# Patient Record
Sex: FEMALE | Race: OTHER | HISPANIC OR LATINO
[De-identification: names, ages, dates, MRNs, and addresses within clinical notes are randomized per-mention and may not be internally consistent; named-entity substitution may affect disease eponyms.]

---

## 2017-12-18 ENCOUNTER — APPOINTMENT (OUTPATIENT)
Dept: ANTEPARTUM | Facility: CLINIC | Age: 32
End: 2017-12-18
Payer: COMMERCIAL

## 2017-12-18 ENCOUNTER — ASOB RESULT (OUTPATIENT)
Age: 32
End: 2017-12-18

## 2017-12-18 DIAGNOSIS — O35.1XX1 MATERNAL CARE FOR (SUSPECTED) CHROMOSOMAL ABNORMALITY IN FETUS, FETUS 1: ICD-10-CM

## 2017-12-18 PROCEDURE — 36416 COLLJ CAPILLARY BLOOD SPEC: CPT

## 2017-12-18 PROCEDURE — 76801 OB US < 14 WKS SINGLE FETUS: CPT

## 2017-12-18 PROCEDURE — 76813 OB US NUCHAL MEAS 1 GEST: CPT

## 2017-12-22 LAB
1ST TRIMESTER DATA: NORMAL
ADDENDUM DOC: NORMAL
AFP PNL SERPL: NORMAL
AFP SERPL-ACNC: NORMAL
CLINICAL BIOCHEMIST REVIEW: NORMAL
FREE BETA HCG 1ST TRIMESTER: NORMAL
Lab: NORMAL
NOTES NTD: NORMAL
NT: NORMAL
PAPP-A SERPL-ACNC: NORMAL
TRISOMY 18/3: NORMAL

## 2018-01-19 ENCOUNTER — EMERGENCY (EMERGENCY)
Facility: HOSPITAL | Age: 33
LOS: 1 days | Discharge: HOME | End: 2018-01-19
Attending: EMERGENCY MEDICINE | Admitting: EMERGENCY MEDICINE
Payer: COMMERCIAL

## 2018-01-19 VITALS
RESPIRATION RATE: 18 BRPM | HEART RATE: 90 BPM | OXYGEN SATURATION: 99 % | SYSTOLIC BLOOD PRESSURE: 118 MMHG | DIASTOLIC BLOOD PRESSURE: 53 MMHG | TEMPERATURE: 98 F

## 2018-01-19 PROCEDURE — 99283 EMERGENCY DEPT VISIT LOW MDM: CPT

## 2018-01-19 NOTE — ED ADULT TRIAGE NOTE - CHIEF COMPLAINT QUOTE
pt 17 weeks  pregnant kitty 6/27 c/o pain to coccyx area that began this morning. no trauma or injury, pmh herpes states "this was the pain that happened when I was diagnosed with herpes," pt denies abd pain, vaginal bleeding, n/v spoke with l&d, pt to be seen in ed. appears comfortable in triage, no other pmh

## 2018-01-20 RX ORDER — IBUPROFEN 200 MG
400 TABLET ORAL ONCE
Qty: 0 | Refills: 0 | Status: COMPLETED | OUTPATIENT
Start: 2018-01-20 | End: 2018-01-20

## 2018-01-20 RX ORDER — ACETAMINOPHEN 500 MG
975 TABLET ORAL ONCE
Qty: 0 | Refills: 0 | Status: COMPLETED | OUTPATIENT
Start: 2018-01-20 | End: 2018-01-20

## 2018-01-20 RX ORDER — IBUPROFEN 200 MG
1 TABLET ORAL
Qty: 50 | Refills: 0
Start: 2018-01-20

## 2018-01-20 RX ORDER — ACYCLOVIR SODIUM 500 MG
1 VIAL (EA) INTRAVENOUS
Qty: 15 | Refills: 0
Start: 2018-01-20 | End: 2018-01-24

## 2018-01-20 RX ORDER — ACETAMINOPHEN 500 MG
1 TABLET ORAL
Qty: 50 | Refills: 0
Start: 2018-01-20

## 2018-01-20 RX ADMIN — Medication 975 MILLIGRAM(S): at 01:57

## 2018-01-20 RX ADMIN — Medication 400 MILLIGRAM(S): at 01:58

## 2018-01-20 NOTE — ED PROVIDER NOTE - MUSCULOSKELETAL, MLM
Spine appears normal, range of motion is not limited, no muscle or joint tenderness. Coccygeal area TTP.

## 2018-01-20 NOTE — ED PROVIDER NOTE - OBJECTIVE STATEMENT
Heriberto: 32 F, 17-weeks pregnant, c/o 2 days coccygeal pain. No trauma/F. H/o genital herpes, never had an outbreak. No rash now. Pain w/ pressure on coccyx.

## 2018-01-20 NOTE — ED PROVIDER NOTE - PLAN OF CARE
Take medications as prescribed. Use acetaminophen (Tylenol) first to control pain. If not effective, take the ibuprofen in addition to the acetaminophen (Tylenol). Do not take more than 1 week of ibuprofen.   If rash appears in affected area, fill the prescription for Acyclovir.    Follow up your OB doctor about this issue (these issues): coccydynia, likely related to ligamentous stretch.     Return if pain not controlled with oral medications.     Use sitting donut to relieve pressure on the painful area. Take medications as prescribed. Use acetaminophen (Tylenol) first to control pain. If not effective, take the ibuprofen in addition to the acetaminophen (Tylenol). Do not take more than 1 week of ibuprofen.   If rash appears in affected area, fill the prescription for Acyclovir.    Follow up your OB doctor about this issue (these issues): coccydynia, likely related to ligamentous stretch.     Return if pain not controlled with oral medications.     Use sitting donut to relieve pressure on the painful area.    Use warm compresses (such as warm rice in a plastic bag) for 15 minutes 3 times a day.

## 2018-01-20 NOTE — ED PROVIDER NOTE - CARE PLAN
Principal Discharge DX:	Coccydynia  Assessment and plan of treatment:	Take medications as prescribed. Use acetaminophen (Tylenol) first to control pain. If not effective, take the ibuprofen in addition to the acetaminophen (Tylenol). Do not take more than 1 week of ibuprofen.   If rash appears in affected area, fill the prescription for Acyclovir.    Follow up your OB doctor about this issue (these issues): coccydynia, likely related to ligamentous stretch.     Return if pain not controlled with oral medications.     Use sitting donut to relieve pressure on the painful area.  Secondary Diagnosis:	Pregnancy Principal Discharge DX:	Coccydynia  Assessment and plan of treatment:	Take medications as prescribed. Use acetaminophen (Tylenol) first to control pain. If not effective, take the ibuprofen in addition to the acetaminophen (Tylenol). Do not take more than 1 week of ibuprofen.   If rash appears in affected area, fill the prescription for Acyclovir.    Follow up your OB doctor about this issue (these issues): coccydynia, likely related to ligamentous stretch.     Return if pain not controlled with oral medications.     Use sitting donut to relieve pressure on the painful area.    Use warm compresses (such as warm rice in a plastic bag) for 15 minutes 3 times a day.  Secondary Diagnosis:	Pregnancy

## 2018-01-20 NOTE — ED PROVIDER NOTE - MEDICAL DECISION MAKING DETAILS
Heriberto: Coccydynia likely 2/2 ligament stretch during pregnancy. Less likely herpes simplex prodrome.

## 2018-02-19 ENCOUNTER — APPOINTMENT (OUTPATIENT)
Dept: ANTEPARTUM | Facility: CLINIC | Age: 33
End: 2018-02-19
Payer: COMMERCIAL

## 2018-02-19 ENCOUNTER — ASOB RESULT (OUTPATIENT)
Age: 33
End: 2018-02-19

## 2018-02-19 PROCEDURE — 76811 OB US DETAILED SNGL FETUS: CPT | Mod: 59

## 2018-02-19 PROCEDURE — 76817 TRANSVAGINAL US OBSTETRIC: CPT

## 2018-04-23 ENCOUNTER — APPOINTMENT (OUTPATIENT)
Dept: ANTEPARTUM | Facility: CLINIC | Age: 33
End: 2018-04-23
Payer: COMMERCIAL

## 2018-04-23 ENCOUNTER — ASOB RESULT (OUTPATIENT)
Age: 33
End: 2018-04-23

## 2018-04-23 PROCEDURE — 76816 OB US FOLLOW-UP PER FETUS: CPT

## 2018-04-23 PROCEDURE — 76819 FETAL BIOPHYS PROFIL W/O NST: CPT

## 2018-05-30 ENCOUNTER — APPOINTMENT (OUTPATIENT)
Dept: ANTEPARTUM | Facility: CLINIC | Age: 33
End: 2018-05-30
Payer: COMMERCIAL

## 2018-05-30 ENCOUNTER — ASOB RESULT (OUTPATIENT)
Age: 33
End: 2018-05-30

## 2018-05-30 PROCEDURE — 76819 FETAL BIOPHYS PROFIL W/O NST: CPT

## 2018-05-30 PROCEDURE — 76820 UMBILICAL ARTERY ECHO: CPT

## 2018-05-30 PROCEDURE — 76816 OB US FOLLOW-UP PER FETUS: CPT

## 2018-07-25 PROBLEM — O35.1XX1 CHROMOSOMAL ABNORMALITY IN FETUS AFFECTING CARE OF MOTHER, FETUS 1: Status: ACTIVE | Noted: 2017-12-18

## 2018-09-20 ENCOUNTER — APPOINTMENT (OUTPATIENT)
Dept: SURGERY | Facility: CLINIC | Age: 33
End: 2018-09-20

## 2018-09-20 VITALS
DIASTOLIC BLOOD PRESSURE: 64 MMHG | SYSTOLIC BLOOD PRESSURE: 128 MMHG | BODY MASS INDEX: 48.09 KG/M2 | HEIGHT: 61.5 IN | WEIGHT: 258 LBS

## 2020-04-25 ENCOUNTER — APPOINTMENT (OUTPATIENT)
Dept: ANTEPARTUM | Facility: CLINIC | Age: 35
End: 2020-04-25
Payer: COMMERCIAL

## 2020-04-25 PROCEDURE — 76817 TRANSVAGINAL US OBSTETRIC: CPT

## 2020-04-25 PROCEDURE — 76811 OB US DETAILED SNGL FETUS: CPT

## 2020-05-19 ENCOUNTER — OUTPATIENT (OUTPATIENT)
Dept: INPATIENT UNIT | Facility: HOSPITAL | Age: 35
LOS: 1 days | Discharge: HOME | End: 2020-05-19
Payer: COMMERCIAL

## 2020-05-19 VITALS — SYSTOLIC BLOOD PRESSURE: 99 MMHG | HEART RATE: 62 BPM | DIASTOLIC BLOOD PRESSURE: 55 MMHG

## 2020-05-19 VITALS
SYSTOLIC BLOOD PRESSURE: 100 MMHG | HEART RATE: 75 BPM | TEMPERATURE: 99 F | DIASTOLIC BLOOD PRESSURE: 59 MMHG | RESPIRATION RATE: 16 BRPM

## 2020-05-19 DIAGNOSIS — Z3A.00 WEEKS OF GESTATION OF PREGNANCY NOT SPECIFIED: ICD-10-CM

## 2020-05-19 DIAGNOSIS — O26.899 OTHER SPECIFIED PREGNANCY RELATED CONDITIONS, UNSPECIFIED TRIMESTER: ICD-10-CM

## 2020-05-19 PROCEDURE — 99213 OFFICE O/P EST LOW 20 MIN: CPT | Mod: 25

## 2020-05-19 PROCEDURE — 59025 FETAL NON-STRESS TEST: CPT | Mod: 26

## 2020-05-19 NOTE — OB PROVIDER TRIAGE NOTE - NSHPPHYSICALEXAM_GEN_ALL_CORE
A/O x 3  NAD   Vital Signs Last 24 Hrs  T(C): 37.1 (19 May 2020 17:40), Max: 37.1 (19 May 2020 17:40)  T(F): 98.8 (19 May 2020 17:40), Max: 98.8 (19 May 2020 17:40)  HR: 63 (19 May 2020 18:07) (63 - 75)  BP: 100/55 (19 May 2020 18:07) (100/55 - 100/59)  BP(mean): --  RR: 16 (19 May 2020 17:40) (16 - 16)  SpO2: --  Abdomen is soft NT and gravid with no ctx or pain palpable   SSE: No pooling, scant increase whitish discharge visualized, Nitrazine negative, fern Negative, Spermatozoa visualized on microscopic testing, and cervix closed on visual  FHR:   TOCO: No CTX A/O x 3  NAD   Vital Signs Last 24 Hrs  T(C): 37.1 (19 May 2020 17:40), Max: 37.1 (19 May 2020 17:40)  T(F): 98.8 (19 May 2020 17:40), Max: 98.8 (19 May 2020 17:40)  HR: 63 (19 May 2020 18:07) (63 - 75)  BP: 100/55 (19 May 2020 18:07) (100/55 - 100/59)  BP(mean): --  RR: 16 (19 May 2020 17:40) (16 - 16)  SpO2: --  Abdomen is soft NT and gravid with no ctx or pain palpable   SSE: No pooling, scant increase whitish discharge visualized, Nitrazine negative, fern Negative, Spermatozoa visualized on microscopic testing, and cervix closed on visual  FHR:  Cat 1 for GA

## 2020-05-19 NOTE — CHART NOTE - NSCHARTNOTEFT_GEN_A_CORE
Name:  Meghan Rachel    MRN:  [id number]    Chief Complaint:  Lof @ 1545    EDC:  2020    Gestational Age:      Parity:      Ob History:   - 2018 - 7lbs    Vital Signs: t 36.7, hr 68, bp 109/63 r 18    Acuity Score: 2    Additional Comments:  Coitus @ 1500

## 2020-05-19 NOTE — OB PROVIDER TRIAGE NOTE - ADDITIONAL INSTRUCTIONS
PTL precautions reviewed and OB F/U with Dr Winston as scheduled PTL precautions reviewed and OB F/U as scheduled with Dr Romero

## 2020-05-19 NOTE — OB PROVIDER TRIAGE NOTE - NSOBPROVIDERNOTE_OBGYN_ALL_OB_FT
36 yo  @ 24 wks GA (EDC 2020) with no evidence of PPROM  - Likely vaginal discharge post coital  - Patient educated and Precautions reviewed  - Dw Dr Rice (OB Attending Covering) 36 yo  @ 24 wks GA (EDC 2020) with no evidence of PPROM  - Likely vaginal discharge post coital  - Patient educated and Precautions reviewed  - Dw Dr Rice (OB Attending Covering)  Okay for discharge home

## 2020-05-19 NOTE — OB RN TRIAGE NOTE - PMH
Herpes simplex  HSV2 last outbreak 10 years ago  No pertinent past medical history    Vaginal delivery  2018 F 7#

## 2020-05-19 NOTE — OB PROVIDER TRIAGE NOTE - HISTORY OF PRESENT ILLNESS
34 yo  @ 24 wks GA (EDC 2020) with c/o an episode of vaginal discharge s/p coitus at 1445, Vaginal discharge vs lof at 1545. Denies any VB, and reports good FM's. Patient denies any complications with pregnancy thus far.  PNC: Dr Romero

## 2020-06-29 PROBLEM — B00.9 HERPESVIRAL INFECTION, UNSPECIFIED: Chronic | Status: ACTIVE | Noted: 2020-05-19

## 2020-07-06 ENCOUNTER — APPOINTMENT (OUTPATIENT)
Dept: ANTEPARTUM | Facility: CLINIC | Age: 35
End: 2020-07-06
Payer: COMMERCIAL

## 2020-07-06 PROCEDURE — 76816 OB US FOLLOW-UP PER FETUS: CPT

## 2020-07-06 PROCEDURE — 76819 FETAL BIOPHYS PROFIL W/O NST: CPT

## 2020-09-01 ENCOUNTER — APPOINTMENT (OUTPATIENT)
Dept: ANTEPARTUM | Facility: CLINIC | Age: 35
End: 2020-09-01

## 2020-09-04 ENCOUNTER — APPOINTMENT (OUTPATIENT)
Dept: ANTEPARTUM | Facility: CLINIC | Age: 35
End: 2020-09-04
Payer: COMMERCIAL

## 2020-09-04 PROCEDURE — 76819 FETAL BIOPHYS PROFIL W/O NST: CPT

## 2020-09-04 PROCEDURE — 76816 OB US FOLLOW-UP PER FETUS: CPT

## 2020-09-08 DIAGNOSIS — Z01.818 ENCOUNTER FOR OTHER PREPROCEDURAL EXAMINATION: ICD-10-CM

## 2020-09-09 ENCOUNTER — APPOINTMENT (OUTPATIENT)
Dept: ANTEPARTUM | Facility: CLINIC | Age: 35
End: 2020-09-09
Payer: COMMERCIAL

## 2020-09-09 ENCOUNTER — APPOINTMENT (OUTPATIENT)
Dept: DISASTER EMERGENCY | Facility: CLINIC | Age: 35
End: 2020-09-09
Payer: COMMERCIAL

## 2020-09-09 PROCEDURE — 76816 OB US FOLLOW-UP PER FETUS: CPT

## 2020-09-09 PROCEDURE — 76819 FETAL BIOPHYS PROFIL W/O NST: CPT | Mod: 59

## 2020-09-09 PROCEDURE — 76818 FETAL BIOPHYS PROFILE W/NST: CPT

## 2020-09-10 LAB — SARS-COV-2 N GENE NPH QL NAA+PROBE: NOT DETECTED

## 2020-09-11 ENCOUNTER — INPATIENT (INPATIENT)
Facility: HOSPITAL | Age: 35
LOS: 1 days | Discharge: HOME | End: 2020-09-13
Attending: SPECIALIST | Admitting: SPECIALIST

## 2020-09-11 VITALS
SYSTOLIC BLOOD PRESSURE: 100 MMHG | HEART RATE: 72 BPM | HEIGHT: 65 IN | DIASTOLIC BLOOD PRESSURE: 59 MMHG | RESPIRATION RATE: 18 BRPM | WEIGHT: 188.94 LBS | TEMPERATURE: 99 F

## 2020-09-11 DIAGNOSIS — O48.0 POST-TERM PREGNANCY: ICD-10-CM

## 2020-09-11 LAB
BASOPHILS # BLD AUTO: 0.03 K/UL — SIGNIFICANT CHANGE UP (ref 0–0.2)
BASOPHILS NFR BLD AUTO: 0.3 % — SIGNIFICANT CHANGE UP (ref 0–2)
BLD GP AB SCN SERPL QL: NEGATIVE — SIGNIFICANT CHANGE UP
EOSINOPHIL # BLD AUTO: 0.07 K/UL — SIGNIFICANT CHANGE UP (ref 0–0.5)
EOSINOPHIL NFR BLD AUTO: 0.7 % — SIGNIFICANT CHANGE UP (ref 0–6)
HCT VFR BLD CALC: 39.2 % — SIGNIFICANT CHANGE UP (ref 34.5–45)
HGB BLD-MCNC: 12.9 G/DL — SIGNIFICANT CHANGE UP (ref 11.5–15.5)
IMM GRANULOCYTES NFR BLD AUTO: 0.9 % — SIGNIFICANT CHANGE UP (ref 0–1.5)
LYMPHOCYTES # BLD AUTO: 1.15 K/UL — SIGNIFICANT CHANGE UP (ref 1–3.3)
LYMPHOCYTES # BLD AUTO: 11.8 % — LOW (ref 13–44)
MCHC RBC-ENTMCNC: 29.3 PG — SIGNIFICANT CHANGE UP (ref 27–34)
MCHC RBC-ENTMCNC: 32.9 % — SIGNIFICANT CHANGE UP (ref 32–36)
MCV RBC AUTO: 89.1 FL — SIGNIFICANT CHANGE UP (ref 80–100)
MONOCYTES # BLD AUTO: 0.75 K/UL — SIGNIFICANT CHANGE UP (ref 0–0.9)
MONOCYTES NFR BLD AUTO: 7.7 % — SIGNIFICANT CHANGE UP (ref 2–14)
NEUTROPHILS # BLD AUTO: 7.66 K/UL — HIGH (ref 1.8–7.4)
NEUTROPHILS NFR BLD AUTO: 78.6 % — HIGH (ref 43–77)
NRBC # FLD: 0 K/UL — SIGNIFICANT CHANGE UP (ref 0–0)
PLATELET # BLD AUTO: 236 K/UL — SIGNIFICANT CHANGE UP (ref 150–400)
PMV BLD: 10.9 FL — SIGNIFICANT CHANGE UP (ref 7–13)
RBC # BLD: 4.4 M/UL — SIGNIFICANT CHANGE UP (ref 3.8–5.2)
RBC # FLD: 12.2 % — SIGNIFICANT CHANGE UP (ref 10.3–14.5)
RH IG SCN BLD-IMP: POSITIVE — SIGNIFICANT CHANGE UP
RH IG SCN BLD-IMP: POSITIVE — SIGNIFICANT CHANGE UP
WBC # BLD: 9.75 K/UL — SIGNIFICANT CHANGE UP (ref 3.8–10.5)
WBC # FLD AUTO: 9.75 K/UL — SIGNIFICANT CHANGE UP (ref 3.8–10.5)

## 2020-09-11 RX ORDER — OXYTOCIN 10 UNIT/ML
333.33 VIAL (ML) INJECTION
Qty: 20 | Refills: 0 | Status: DISCONTINUED | OUTPATIENT
Start: 2020-09-11 | End: 2020-09-12

## 2020-09-11 RX ORDER — INFLUENZA VIRUS VACCINE 15; 15; 15; 15 UG/.5ML; UG/.5ML; UG/.5ML; UG/.5ML
0.5 SUSPENSION INTRAMUSCULAR ONCE
Refills: 0 | Status: DISCONTINUED | OUTPATIENT
Start: 2020-09-11 | End: 2020-09-13

## 2020-09-11 RX ORDER — SODIUM CHLORIDE 9 MG/ML
1000 INJECTION, SOLUTION INTRAVENOUS
Refills: 0 | Status: DISCONTINUED | OUTPATIENT
Start: 2020-09-11 | End: 2020-09-12

## 2020-09-11 RX ORDER — CITRIC ACID/SODIUM CITRATE 300-500 MG
15 SOLUTION, ORAL ORAL EVERY 6 HOURS
Refills: 0 | Status: DISCONTINUED | OUTPATIENT
Start: 2020-09-11 | End: 2020-09-12

## 2020-09-11 NOTE — OB PROVIDER H&P - ASSESSMENT
P1 @ 41 weeks presenting for a post dates IOL  - admit to L&D  - routine labs  - IV fluids  - PO cytotec IOL    dw dr adam lopez

## 2020-09-11 NOTE — OB RN PATIENT PROFILE - NS PRO DEPRESSION SCREENING Y/N6
Surgeon/Pathologist Verbiage (Will Incorporate Name Of Surgeon From Intro If Not Blank): operated in two distinct and integrated capacities as the surgeon and pathologist. no

## 2020-09-11 NOTE — OB RN PATIENT PROFILE - BLOOD TRANSFUSION, PREVIOUS, PROFILE
Individual Follow-Up Form    3/5/2020    Quit Date: TBD    Clinical Status of Patient: Outpatient    Length of Service: 30 minutes    Continuing Medication: yes  Nicotine Lozenges    Other Medications: none     Target Symptoms: Withdrawal and medication side effects. The following were rated moderate (3) to severe (4) on TCRS:  · Moderate (3): none  · Severe (4): none    Comments:  Patient presents to clinic and reports being tobacco free for a few days, but then went to [employer] on Saturday and bought a pack and smoked the last one today. Patient remains on prescribed tobacco cessation medication regimen of 2 mg lozenges PRN, without any negative side effects at this time. Discussed his relapse and patient agreed on not buying anymore and making today his quit date. Pt's goal for the week is to remain tobacco free, use the lozenges and cinnamon toothpicks more when he gets strong urges and cravings. Session handout provided and reviewed. Patient is to follow up in 2 weeks.      Diagnosis: F17.200    Next Visit: 2 weeks  
no

## 2020-09-11 NOTE — OB PROVIDER H&P - HISTORY OF PRESENT ILLNESS
Patient is a  @ 41 weeks GBS neg EFW 8.5lbs presenting for a post dates IOL. denies ctx, lof, vb & endorses +FM. PNC uncomplicated per patient. VE yesterday in office closed. Accepts blood transfusion    POBHx: 2018  FT 8.5lbs   GynHx: denies  MedHx: denies  SHx: denies  NKDA  Meds: PNV  Social: denies    VS  T(C): 37.1 (20 @ 11:54)  HR: --  BP: --  RR: --  SpO2: --    comfortable  abd soft gravid  120/mod david/+accels/no decels  [place] occ ctx  0/50/-3  vertex confirmed Patient is a  @ 41 weeks GBS neg EFW 8.5lbs presenting for a post dates IOL. denies ctx, lof, vb & endorses +FM. PNC uncomplicated per patient. VE yesterday in office closed. Accepts blood transfusion    POBHx: 2018  FT 8.5lbs   GynHx: HSV + last outbreak 10 yrs ago  MedHx: denies  SHx: denies  NKDA  Meds: PNV  Social: denies    VS  T(C): 37.1 (20 @ 11:54)  HR: --  BP: --  RR: --  SpO2: --    comfortable  abd soft gravid  120/mod david/+accels/no decels  [place] occ ctx  0/50/-3  vertex confirmed  SSE neg

## 2020-09-11 NOTE — CHART NOTE - NSCHARTNOTEFT_GEN_A_CORE
R2 Progress Note    Pt checked for placement of CB.    Vital Signs Last 24 Hrs  T(C): 37.1 (11 Sep 2020 11:54), Max: 37.1 (11 Sep 2020 11:54)  HR: --  BP: --    /mod/+accel/-decel  [place] q6min  VE 1/50/-3    CB 60/60cc sterile saline placed in uterine/vaginal balloons by Ivonne BOCANEGRA  c/w PO as prev discussed    Loan Campbell R2

## 2020-09-12 LAB — T PALLIDUM AB TITR SER: NEGATIVE — SIGNIFICANT CHANGE UP

## 2020-09-12 RX ORDER — OXYTOCIN 10 UNIT/ML
333.33 VIAL (ML) INJECTION
Qty: 20 | Refills: 0 | Status: DISCONTINUED | OUTPATIENT
Start: 2020-09-12 | End: 2020-09-12

## 2020-09-12 RX ORDER — KETOROLAC TROMETHAMINE 30 MG/ML
30 SYRINGE (ML) INJECTION ONCE
Refills: 0 | Status: DISCONTINUED | OUTPATIENT
Start: 2020-09-12 | End: 2020-09-12

## 2020-09-12 RX ORDER — TRANEXAMIC ACID 100 MG/ML
1000 INJECTION, SOLUTION INTRAVENOUS ONCE
Refills: 0 | Status: DISCONTINUED | OUTPATIENT
Start: 2020-09-12 | End: 2020-09-13

## 2020-09-12 RX ORDER — AER TRAVELER 0.5 G/1
1 SOLUTION RECTAL; TOPICAL EVERY 4 HOURS
Refills: 0 | Status: DISCONTINUED | OUTPATIENT
Start: 2020-09-12 | End: 2020-09-13

## 2020-09-12 RX ORDER — IBUPROFEN 200 MG
600 TABLET ORAL EVERY 6 HOURS
Refills: 0 | Status: COMPLETED | OUTPATIENT
Start: 2020-09-12 | End: 2021-08-11

## 2020-09-12 RX ORDER — SODIUM CHLORIDE 9 MG/ML
3 INJECTION INTRAMUSCULAR; INTRAVENOUS; SUBCUTANEOUS EVERY 8 HOURS
Refills: 0 | Status: DISCONTINUED | OUTPATIENT
Start: 2020-09-12 | End: 2020-09-13

## 2020-09-12 RX ORDER — IBUPROFEN 200 MG
600 TABLET ORAL EVERY 6 HOURS
Refills: 0 | Status: DISCONTINUED | OUTPATIENT
Start: 2020-09-12 | End: 2020-09-13

## 2020-09-12 RX ORDER — OXYCODONE HYDROCHLORIDE 5 MG/1
5 TABLET ORAL
Refills: 0 | Status: DISCONTINUED | OUTPATIENT
Start: 2020-09-12 | End: 2020-09-13

## 2020-09-12 RX ORDER — TETANUS TOXOID, REDUCED DIPHTHERIA TOXOID AND ACELLULAR PERTUSSIS VACCINE, ADSORBED 5; 2.5; 8; 8; 2.5 [IU]/.5ML; [IU]/.5ML; UG/.5ML; UG/.5ML; UG/.5ML
0.5 SUSPENSION INTRAMUSCULAR ONCE
Refills: 0 | Status: DISCONTINUED | OUTPATIENT
Start: 2020-09-12 | End: 2020-09-13

## 2020-09-12 RX ORDER — ACETAMINOPHEN 500 MG
975 TABLET ORAL
Refills: 0 | Status: DISCONTINUED | OUTPATIENT
Start: 2020-09-12 | End: 2020-09-13

## 2020-09-12 RX ORDER — BENZOCAINE 10 %
1 GEL (GRAM) MUCOUS MEMBRANE EVERY 6 HOURS
Refills: 0 | Status: DISCONTINUED | OUTPATIENT
Start: 2020-09-12 | End: 2020-09-13

## 2020-09-12 RX ORDER — LANOLIN
1 OINTMENT (GRAM) TOPICAL EVERY 6 HOURS
Refills: 0 | Status: DISCONTINUED | OUTPATIENT
Start: 2020-09-12 | End: 2020-09-13

## 2020-09-12 RX ORDER — DIBUCAINE 1 %
1 OINTMENT (GRAM) RECTAL EVERY 6 HOURS
Refills: 0 | Status: DISCONTINUED | OUTPATIENT
Start: 2020-09-12 | End: 2020-09-13

## 2020-09-12 RX ORDER — HYDROCORTISONE 1 %
1 OINTMENT (GRAM) TOPICAL EVERY 6 HOURS
Refills: 0 | Status: DISCONTINUED | OUTPATIENT
Start: 2020-09-12 | End: 2020-09-13

## 2020-09-12 RX ORDER — MAGNESIUM HYDROXIDE 400 MG/1
30 TABLET, CHEWABLE ORAL
Refills: 0 | Status: DISCONTINUED | OUTPATIENT
Start: 2020-09-12 | End: 2020-09-13

## 2020-09-12 RX ORDER — PRAMOXINE HYDROCHLORIDE 150 MG/15G
1 AEROSOL, FOAM RECTAL EVERY 4 HOURS
Refills: 0 | Status: DISCONTINUED | OUTPATIENT
Start: 2020-09-12 | End: 2020-09-13

## 2020-09-12 RX ORDER — OXYCODONE HYDROCHLORIDE 5 MG/1
5 TABLET ORAL ONCE
Refills: 0 | Status: DISCONTINUED | OUTPATIENT
Start: 2020-09-12 | End: 2020-09-13

## 2020-09-12 RX ORDER — SIMETHICONE 80 MG/1
80 TABLET, CHEWABLE ORAL EVERY 4 HOURS
Refills: 0 | Status: DISCONTINUED | OUTPATIENT
Start: 2020-09-12 | End: 2020-09-13

## 2020-09-12 RX ORDER — DIPHENHYDRAMINE HCL 50 MG
25 CAPSULE ORAL EVERY 6 HOURS
Refills: 0 | Status: DISCONTINUED | OUTPATIENT
Start: 2020-09-12 | End: 2020-09-13

## 2020-09-12 RX ADMIN — SODIUM CHLORIDE 3 MILLILITER(S): 9 INJECTION INTRAMUSCULAR; INTRAVENOUS; SUBCUTANEOUS at 14:46

## 2020-09-12 RX ADMIN — PRAMOXINE HYDROCHLORIDE 1 APPLICATION(S): 150 AEROSOL, FOAM RECTAL at 22:30

## 2020-09-12 RX ADMIN — Medication 1 APPLICATION(S): at 22:30

## 2020-09-12 RX ADMIN — SODIUM CHLORIDE 3 MILLILITER(S): 9 INJECTION INTRAMUSCULAR; INTRAVENOUS; SUBCUTANEOUS at 22:11

## 2020-09-12 RX ADMIN — AER TRAVELER 1 APPLICATION(S): 0.5 SOLUTION RECTAL; TOPICAL at 22:30

## 2020-09-12 RX ADMIN — Medication 600 MILLIGRAM(S): at 12:00

## 2020-09-12 RX ADMIN — Medication 600 MILLIGRAM(S): at 13:00

## 2020-09-12 RX ADMIN — Medication 1 SPRAY(S): at 22:30
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
## 2020-09-12 NOTE — PROGRESS NOTE ADULT - SUBJECTIVE AND OBJECTIVE BOX
Post partum Day 1      Pt without complaints    Vital Signs Last 24 Hrs  T(C): 36.9 (12 Sep 2020 10:43), Max: 37.1 (11 Sep 2020 11:54)  T(F): 98.4 (12 Sep 2020 10:43), Max: 98.8 (11 Sep 2020 11:54)  HR: 69 (12 Sep 2020 10:43) (69 - 122)  BP: 102/64 (12 Sep 2020 10:43) (95/51 - 193/70)  BP(mean): --  RR: 17 (12 Sep 2020 10:43) (17 - 18)  SpO2: 100% (12 Sep 2020 10:43) (88% - 100%)                        12.9   9.75  )-----------( 236      ( 11 Sep 2020 12:30 )             39.2     MEDICATIONS  (STANDING):  influenza   Vaccine 0.5 milliLiter(s) IntraMuscular once  misoprostol Oral Solution 40 MICROGram(s) Oral every 2 hours  oxytocin Infusion 333.333 milliUNIT(s)/Min (1000 mL/Hr) IV Continuous <Continuous>    MEDICATIONS  (PRN):      Abdomen soft  fundus firm, non tender  extremities non tender    lochia wnl      Patient doing well  Routine post partum care

## 2020-09-13 ENCOUNTER — TRANSCRIPTION ENCOUNTER (OUTPATIENT)
Age: 35
End: 2020-09-13

## 2020-09-13 VITALS
OXYGEN SATURATION: 99 % | RESPIRATION RATE: 18 BRPM | SYSTOLIC BLOOD PRESSURE: 99 MMHG | HEART RATE: 67 BPM | TEMPERATURE: 98 F | DIASTOLIC BLOOD PRESSURE: 762 MMHG

## 2020-09-13 RX ORDER — IBUPROFEN 200 MG
1 TABLET ORAL
Qty: 0 | Refills: 0 | DISCHARGE
Start: 2020-09-13

## 2020-09-13 RX ORDER — ACETAMINOPHEN 500 MG
3 TABLET ORAL
Qty: 0 | Refills: 0 | DISCHARGE
Start: 2020-09-13

## 2020-09-13 RX ADMIN — Medication 600 MILLIGRAM(S): at 06:03

## 2020-09-13 RX ADMIN — Medication 600 MILLIGRAM(S): at 06:30

## 2020-09-13 RX ADMIN — SODIUM CHLORIDE 3 MILLILITER(S): 9 INJECTION INTRAMUSCULAR; INTRAVENOUS; SUBCUTANEOUS at 06:17

## 2020-09-13 NOTE — DISCHARGE NOTE OB - CARE PLAN
Principal Discharge DX:	Vaginal delivery  Goal:	RECOVERY  Assessment and plan of treatment:	REGULAR DIET  AMBULATION ENCOURAGED

## 2020-09-13 NOTE — PROGRESS NOTE ADULT - PROBLEM SELECTOR PLAN 1
- Pain well controlled, continue current pain regimen  - Increase ambulation, SCDs when not ambulating  - Continue regular diet    Mague Thomason MD PGY1

## 2020-09-13 NOTE — PROGRESS NOTE ADULT - SUBJECTIVE AND OBJECTIVE BOX
OB Progress Note:  PPD#1    S: 36yo MHx HSV 10 years ago PPD#1 s/p . Patient feels well. Pain is well controlled. She is tolerating a regular diet and passing flatus. She is voiding spontaneously, and ambulating without difficulty. Denies CP/SOB. Denies lightheadedness/dizziness. Denies N/V.    O:  Vitals:  Vital Signs Last 24 Hrs  T(C): 36.6 (13 Sep 2020 06:23), Max: 36.9 (12 Sep 2020 10:43)  T(F): 97.8 (13 Sep 2020 06:23), Max: 98.4 (12 Sep 2020 10:43)  HR: 59 (13 Sep 2020 06:23) (59 - 98)  BP: 94/61 (13 Sep 2020 06:23) (94/61 - 106/72)  BP(mean): --  RR: 18 (13 Sep 2020 06:23) (15 - 18)  SpO2: 97% (13 Sep 2020 06:23) (97% - 100%)    MEDICATIONS  (STANDING):  acetaminophen   Tablet .. 975 milliGRAM(s) Oral <User Schedule>  diphtheria/tetanus/pertussis (acellular) Vaccine (ADAcel) 0.5 milliLiter(s) IntraMuscular once  ibuprofen  Tablet. 600 milliGRAM(s) Oral every 6 hours  influenza   Vaccine 0.5 milliLiter(s) IntraMuscular once  methylergonovine Injectable 0.2 milliGRAM(s) IntraMuscular once  prenatal multivitamin 1 Tablet(s) Oral daily  sodium chloride 0.9% lock flush 3 milliLiter(s) IV Push every 8 hours  tranexamic acid IVPB 1000 milliGRAM(s) IV Intermittent once      Labs:  Blood type: A Positive  Rubella IgG: RPR: Negative                          12.9   9.75 >-----------< 236    (  @ 12:30 )             39.2    Physical Exam:  General: NAD  Abdomen: soft, non-tender, non-distended, fundus firm  Vaginal: Lochia wnl  Extremities: No erythema/edema

## 2020-09-13 NOTE — DISCHARGE NOTE OB - CARE PROVIDER_API CALL
George Romero  OBSTETRICS AND GYNECOLOGY  [address] [place] [address]  Phone: [phone]  Fax: [phone]  Follow Up Time: 1 month

## 2020-09-13 NOTE — DISCHARGE NOTE OB - MEDICATION SUMMARY - MEDICATIONS TO TAKE
I will START or STAY ON the medications listed below when I get home from the hospital:    acetaminophen 325 mg oral tablet  -- 3 tab(s) by mouth   -- Indication: For Vaginal delivery    ibuprofen 600 mg oral tablet  -- 1 tab(s) by mouth every 6 hours  -- Indication: For Vaginal delivery    Prenatal 1  -- Indication: For Vaginal delivery

## 2020-09-13 NOTE — DISCHARGE NOTE OB - PATIENT PORTAL LINK FT
You can access the FollowMyHealth Patient Portal offered by [facility] by registering at the following website: [email or web address]. By joining [employer]’s FollowMyHealth portal, you will also be able to view your health information using other applications (apps) compatible with our system.

## 2020-11-06 NOTE — OB RN DELIVERY SUMMARY - NS_SEPSISRSKCALC_OBGYN_ALL_OB_FT
No temperature has been documented for this patient in CPN or on the OB Flowsheet. Ensure the highest temperature during labor was documented on the OB Flowsheet.  GBS status in the 'Prenatal Lab tests/results section' on the OB RN Patient Profile must be documented.

## 2021-04-23 NOTE — DISCHARGE NOTE OB - SWOLLEN, PAINFUL HOT AREAS AND/OR STREAKS ON THE BREAST
35 yo fem with acute appendictis. Ct also showed 2.9 cm left ovarian cyst  -Will take her to OR for lap appendectomy/poss open . Risk/benefit of surgery explained to patient, including, but not limited to intrabdominal abscess, wound infection, bleeding, DVT  -Patient will follow with her GYn regarding 2.9 cm left ovarian cyst  -IV cefotetan  -NPO
Statement Selected

## 2021-05-12 ENCOUNTER — RESULT REVIEW (OUTPATIENT)
Age: 36
End: 2021-05-12

## 2022-09-29 NOTE — OB RN DELIVERY SUMMARY - DELIVERY COMPLICATIONS; ABNORMAL FETAL HEART RATE
no blurred vision/no change in level of consciousness/no confusion/no dizziness/no fever/no nausea/no numbness/no vomiting/no weakness decels in last 10 min of delivery

## 2023-02-28 ENCOUNTER — APPOINTMENT (OUTPATIENT)
Dept: ULTRASOUND IMAGING | Facility: CLINIC | Age: 38
End: 2023-02-28
Payer: COMMERCIAL

## 2023-02-28 ENCOUNTER — OUTPATIENT (OUTPATIENT)
Dept: OUTPATIENT SERVICES | Facility: HOSPITAL | Age: 38
LOS: 1 days | End: 2023-02-28
Payer: COMMERCIAL

## 2023-02-28 DIAGNOSIS — Z41.1 ENCOUNTER FOR COSMETIC SURGERY: ICD-10-CM

## 2023-02-28 PROCEDURE — 93971 EXTREMITY STUDY: CPT

## 2023-02-28 PROCEDURE — 93971 EXTREMITY STUDY: CPT | Mod: 26

## 2023-11-22 NOTE — ED PROVIDER NOTE - SKIN, MLM
Skin normal color for race, warm, dry and intact. No evidence of rash. Azithromycin Pregnancy And Lactation Text: This medication is considered safe during pregnancy and is also secreted in breast milk.

## 2025-02-23 ENCOUNTER — EMERGENCY (EMERGENCY)
Facility: HOSPITAL | Age: 40
LOS: 1 days | Discharge: STILL ADMITTED OR ADMINISTRATIVE | End: 2025-02-23
Admitting: EMERGENCY MEDICINE
Payer: COMMERCIAL

## 2025-02-23 ENCOUNTER — OUTPATIENT (OUTPATIENT)
Dept: INPATIENT UNIT | Facility: HOSPITAL | Age: 40
LOS: 1 days | Discharge: HOME | End: 2025-02-23
Payer: MEDICAID

## 2025-02-23 VITALS
HEART RATE: 100 BPM | TEMPERATURE: 98 F | SYSTOLIC BLOOD PRESSURE: 96 MMHG | RESPIRATION RATE: 20 BRPM | DIASTOLIC BLOOD PRESSURE: 52 MMHG | WEIGHT: 151.9 LBS | OXYGEN SATURATION: 97 % | HEIGHT: 66 IN

## 2025-02-23 VITALS — TEMPERATURE: 99 F | RESPIRATION RATE: 17 BRPM

## 2025-02-23 DIAGNOSIS — O26.899 OTHER SPECIFIED PREGNANCY RELATED CONDITIONS, UNSPECIFIED TRIMESTER: ICD-10-CM

## 2025-02-23 PROCEDURE — L9996: CPT

## 2025-02-23 PROCEDURE — 99222 1ST HOSP IP/OBS MODERATE 55: CPT

## 2025-02-23 NOTE — OB PROVIDER TRIAGE NOTE - NSOBPROVIDERNOTE_OBGYN_ALL_OB_FT
40 year old female patient  @ 17.6GA with SLIUP presents to triage c/o flu like symptoms and decreased fetal movement.  pt vitals stable, afebrile     Orders:  covid/flu/RSV, UA, CBC, CMP  IV hydration, tylenol, zofran    TAS - limited bedside sonogram reassuring 40 year old female patient  @ 17.6GA with SLIUP presents to triage c/o flu like symptoms and decreased fetal movement.  vitals stable, afebrile     Orders:  covid/flu/RSV, UA, CBC, CMP  IV hydration, tylenol, zofran    TAS - limited bedside sonogram reassuring 40 year old female patient  @ 17.6GA with SLIUP presents to triage c/o flu like symptoms and decreased fetal movement.    vitals stable, afebrile     Orders:  covid/flu/RSV, UA, CBC, CMP  IV hydration, tylenol, zofran    TAS - limited bedside sonogram reassuring, Pt sees GFM  SSE/TVS: cervix long/closed    Pt states she feels much better 40 year old female patient  @ 17.6GA with SLIUP presents to triage c/o flu like symptoms and decreased fetal movement.    Pt appears flu-like. vitals stable, afebrile     Orders:  covid/flu/RSV, UA, CBC, CMP  IV hydration, tylenol, zofran    TAS - limited bedside sonogram reassuring, Pt sees GFM  SSE/TVS: cervix long/closed    UA neg for UTI  CBC WBC wnl  CMP some abnormalities - see results above   flu/RSV/covid neg    Pt appears to look better. states she feels much better, rates overall symptoms as 3/10 compared to the 8/10 when she arrived   @ 2:20 Pt offered PO challenge, Pt tolerates pretzels /water.   Pt verbalizes feeling ok to go home    d/w Dr JENNA Dodson PGY 4 who d/w Dr Kasper, plan of care discussed, results reviewed: pt to be discharged home  T 36.9C  BP 84/48, repeat BP still hypotense.   - discussed with Dr KODAK Connelly: give another IV bolus     maternal/fetal surveillance reassuring  d/w Dr Kasper: discharge home  follow up with OB provider as scheduled.  increase fluid hydration.  Take tylenol for fever/pain   rest  reviewed fetal kick count and labor precautions  call OB/return to triage with change in symptoms and or concerns such as decreased fetal movement, leakage of fluid, vaginal bleeding, contractions, pain, fever, headache, blurry vision.  addressed questions and concerns  reviewed labor precautions and discharge papers. pt verbalized understanding and signed.    Discharged 40 year old female patient  @ 17.6GA with SLIUP presents to triage c/o flu like symptoms and decreased fetal movement.    Pt appears flu-like. borderline febrile 37.9C rectal temp     Orders:  covid/flu/RSV, UA, CBC, CMP  IV hydration, tylenol, zofran    TAS - limited bedside sonogram reassuring, Pt sees GFM  SSE/TVS: cervix long/closed    UA neg for UTI  CBC WBC wnl  CMP some abnormalities - see results above   flu/RSV/covid neg    Pt appears to look better. states she feels much better, rates overall symptoms as 3/10 compared to the 8/10 when she arrived   @ 2:20 Pt offered PO challenge, Pt tolerates pretzels /water.   Pt verbalizes feeling ok to go home    d/w Dr JENNA Dodson PGY 4 who d/w Dr Kasper, plan of care discussed, results reviewed: pt to be discharged home  T 36.9C  BP 84/48, repeat BP still hypotense.   - discussed with Dr KODAK Connelly: give another IV bolus   @ 4am following 2nd bolus BP 95/58, pt reports feeling well and states she has a low baseline BP  d/w Dr JENNA Dodson PGY 4, d/w Dr Kasper: discharge home     maternal/fetal surveillance reassuring  d/w Dr Kasper: discharge home  follow up with OB provider as scheduled.  increase fluid hydration.  Take tylenol for fever/pain   rest  reviewed fetal kick count and labor precautions  call OB/return to triage with change in symptoms and or concerns such as decreased fetal movement, leakage of fluid, vaginal bleeding, contractions, pain, fever, headache, blurry vision.  addressed questions and concerns  reviewed labor precautions and discharge papers. pt verbalized understanding and signed.    Discharged 40 year old female patient  @ 17.6GA with SLIUP presents to triage c/o flu like symptoms and decreased fetal movement.    Pt appears flu-like. borderline febrile 37.9C rectal temp     Orders:  covid/flu/RSV, UA, CBC, CMP  IV hydration, tylenol, zofran    TAS - limited bedside sonogram reassuring, Pt sees GFM  SSE/TVS: cervix long/closed    UA neg for UTI  CBC WBC wnl  CMP some abnormalities - see results above   flu/RSV/covid neg    Pt appears to look better. states she feels much better, rates overall symptoms as 3/10 compared to the 8/10 when she arrived   @ 2:20 Pt offered PO challenge, Pt tolerates pretzels /water.   Pt verbalizes feeling ok to go home    d/w Dr JENNA Dodson PGY 4 who d/w Dr Kasper, plan of care discussed, results reviewed: pt to be discharged home  T 36.9C  BP 84/48, repeat BP still hypotense.   - discussed with Dr KODAK Connelly: give another IV bolus   @ 4am following 2nd bolus BP 95/58, pt reports feeling well and states she has a low baseline BP  d/w Dr JENNA Dodson PGY 4, d/w Dr Kasper: discharge home     symptoms likely consistent with viral syndrome   maternal/fetal surveillance reassuring  d/w Dr Kasper: discharge home  follow up with OB provider as scheduled.  increase fluid hydration.  Take tylenol for fever/pain  - last taken 12:30am, pt understands she cant take again before 6:30am  rest  reviewed fetal kick count and labor precautions  call OB/return to triage with change in symptoms and or concerns such as decreased fetal movement, leakage of fluid, vaginal bleeding, contractions, pain, fever, headache, blurry vision.  addressed questions and concerns  reviewed labor precautions and discharge papers. pt verbalized understanding and signed.    Discharged 4:30am

## 2025-02-23 NOTE — OB PROVIDER TRIAGE NOTE - NSHPPHYSICALEXAM_GEN_ALL_CORE
Vital Signs Last 24 Hrs  T(C): 37.4 (23 Feb 2025 23:48), Max: 37.4 (23 Feb 2025 23:48)  T(F): 99.32 (23 Feb 2025 23:48), Max: 99.32 (23 Feb 2025 23:48)  HR: 86 (24 Feb 2025 00:16) (85 - 100)  BP: 91/52 (23 Feb 2025 23:50) (91/52 - 96/52)  BP(mean): --  RR: 17 (23 Feb 2025 23:48) (17 - 20)  SpO2: 99% (24 Feb 2025 00:16) (93% - 99%)    gen: a/o x 3, NAD  head: atramatic, normocephalic  pulm: breathing unlabored on room air. CTA b/l  Cardiac: RRR   abd: soft and nontender, gravid. no rebound and or guearding. no contractions palpated. neg CVA tenderness.  SSE:   SVE:   TAS: breech by sono. anterior placenta. GFM. image saved in ASOB Vital Signs Last 24 Hrs  T(C): 37.4 (23 Feb 2025 23:48), Max: 37.4 (23 Feb 2025 23:48)  T(F): 99.32 (23 Feb 2025 23:48), Max: 99.32 (23 Feb 2025 23:48)  HR: 86 (24 Feb 2025 00:16) (85 - 100)  BP: 91/52 (23 Feb 2025 23:50) (91/52 - 96/52)  BP(mean): --  RR: 17 (23 Feb 2025 23:48) (17 - 20)  SpO2: 99% (24 Feb 2025 00:16) (93% - 99%)    gen: a/o x 3, NAD  head: atraumatic, normocephalic  pulm: breathing unlabored on room air. CTA b/l  Cardiac: RRR   abd: soft and nontender, gravid. no rebound and or guearding. no contractions palpated. neg CVA tenderness.  SSE: cervix appears closed. no LOF/VB/pooling noted. normal leukorrhea visualized. no lesions/outbreaks noted.   TVS: Cervix 4.2-4.7cm without funneling/dynamic changes.   SVE: deferred  TAS: breech by sono. anterior placenta. GFM. M mode 152bpm. MVP 5.7.  image saved in ASOB Vital Signs Last 24 Hrs  T(C): 37.4 (23 Feb 2025 23:48), Max: 37.4 (23 Feb 2025 23:48)  T(F): 99.32 (23 Feb 2025 23:48), Max: 99.32 (23 Feb 2025 23:48)  HR: 86 (24 Feb 2025 00:16) (85 - 100)  BP: 91/52 (23 Feb 2025 23:50) (91/52 - 96/52)  BP(mean): --  RR: 17 (23 Feb 2025 23:48) (17 - 20)  SpO2: 99% (24 Feb 2025 00:16) (93% - 99%)    gen: a/o x 3, NAD  head: atraumatic, normocephalic  pulm: breathing unlabored on room air. CTA b/l  Cardiac: RRR   abd: soft and nontender, gravid. no rebound and or guarding. no contractions palpated. neg CVA tenderness.  SSE: cervix appears closed. no LOF/VB/pooling noted. normal leukorrhea visualized. no lesions/outbreaks noted.   TVS: Cervix 4.2-4.7cm without funneling/dynamic changes.   SVE: deferred  TAS: breech by sono. anterior placenta. GFM. M mode 152bpm. MVP 5.7.  image saved in ASOB

## 2025-02-23 NOTE — OB PROVIDER TRIAGE NOTE - NSHPLABSRESULTS_GEN_ALL_CORE
11.7   7.66  )-----------( 198      ( 24 Feb 2025 00:25 )             34.0   02-24    133[L]  |  100  |  7   ----------------------------<  104[H]  3.3[L]   |  19[L]  |  0.49[L]    Ca    8.0[L]      24 Feb 2025 00:25  Phos  3.3     02-24  Mg     1.60     02-24    TPro  6.4  /  Alb  3.4  /  TBili  1.1  /  DBili  x   /  AST  26  /  ALT  19  /  AlkPhos  57  02-24  Urinalysis Basic - ( 24 Feb 2025 00:25 )    Color: x / Appearance: x / SG: x / pH: x  Gluc: 104 mg/dL / Ketone: x  / Bili: x / Urobili: x   Blood: x / Protein: x / Nitrite: x   Leuk Esterase: x / RBC: x / WBC x   Sq Epi: x / Non Sq Epi: x / Bacteria: x

## 2025-02-23 NOTE — OB PROVIDER TRIAGE NOTE - ADDITIONAL INSTRUCTIONS
follow up with OB provider as scheduled.  increase fluid hydration.  Take tylenol for fever/pain   rest  reviewed fetal kick count and labor precautions  call OB/return to triage with change in symptoms and or concerns such as decreased fetal movement, leakage of fluid, vaginal bleeding, contractions, pain, fever, headache, blurry vision.

## 2025-02-23 NOTE — OB PROVIDER TRIAGE NOTE - HISTORY OF PRESENT ILLNESS
40 year old female patient  @ 17.6GA with SLIUP presents to triage c/o flu like symptoms and decreased fetal movement. Pt reports she awoke this AM with chills, N/V/D and intermittent pelvic cramping. reports vomiting 2x, reports diarrhea throughout the day. did not take any meds at home. reports having fever of 101.4 at home. says she hasnt been feeling FM although she has felt it prior to today.   denies vaginal bleeding, leakage of fluid.   Denies changes in vision, chest pain, palpitations, shortness of breath, cough, constipation, urinary symptoms, edema, sick contacts, recent travel.     PNC with Dr Ramirez  - last visit 2 weeks ago, next visit in 2 weeks     40 year old female patient  @ 17.6GA with SLIUP presents to triage c/o flu like symptoms and decreased fetal movement. Pt reports she awoke this AM with chills, N/V/D and intermittent pelvic cramping. reports vomiting 2x, reports diarrhea throughout the day. did not take any meds at home. reports having fever of 101.4 at home. says she hasnt been feeling FM although she has felt it prior to today. Reports able to tolerate eating bread and drinking water.  denies vaginal bleeding, leakage of fluid.   Denies changes in vision, chest pain, palpitations, shortness of breath, cough, constipation, urinary symptoms, edema, sick contacts, recent travel.     PNC with Dr Ramirez  - last visit 2 weeks ago, next visit in 2 weeks     40 year old female patient  @ 17.6GA with SLIUP presents to triage c/o flu like symptoms and decreased fetal movement. Pt reports she awoke this AM with chills, N/V/D and intermittent pelvic cramping. reports vomiting 2x, reports diarrhea throughout the day. did not take any meds at home. reports having fever of 101.4 at home. says she hasnt been feeling FM today although she has felt it prior to today. Reports able to tolerate eating bread and drinking water.  denies vaginal bleeding, leakage of fluid.   Denies changes in vision, chest pain, palpitations, shortness of breath, cough, constipation, urinary symptoms, edema, sick contacts, recent travel.     PNC with Dr Ramirez  - last visit 2 weeks ago, next visit in 2 weeks

## 2025-02-23 NOTE — OB PROVIDER TRIAGE NOTE - NSICDXPASTMEDICALHX_GEN_ALL_CORE_FT
OPERATIVE REPORT    Preoperative Diagnosis:    1. 32w3d weeks intrauterine pregnancy.   2. Preeclampsia with severe features    3. Breech presentation     Postoperative Diagnosis:   1. Live born infant     Procedure: primary low transverse  section  Surgeon: Dr. Pizarro   Assistant: Sukhi Bryant MD PGY2   Anesthesia: * No anesthesia type entered *, Anesthesiologist: Shar Turner MD  Estimated Blood Loss: Per delivery record   Blood Administered: none   Fluids: IVF per anesthesia record  UOP: clear at the end of procedure  Specimen: None   Antibiotics: Ancef  Grafts/Implants: None  Complications: None   Assistant Tasks: opening, closing, dissecting tissue      Indications:  Nani is a 38 year old  at 32w3d who presented for elevated blood pressures and was subsequently diagnosed with preeclampsia with severe features. Patient laboratory values were concerning for the development of HELLP syndrome and decision was made to move forward with a  delivery. Patient was informed of the risks and benefits of a  section including but not limited to anesthesia risks, infection, bleeding, injury to surrounding organs including uterus, bowel, bladder, nerves, blood vessels and baby. All questions and concerns were addressed and consent was obtained preoperatively. Pt voiced understanding and desire to proceed with  section as discussed.     Findings:   Information for the patient's :  Karthikeyan, Girl Nani [[id number]]   Birth Information  YOB: 2022  Time of birth: 8:38 PM  Delivering clinician: Dina Pizarro  Sex: female  Delivery type: , Low Transverse  Presentation: Breech  Gestational Age: 32w3d    APGARS:    One Minute: 8   Five Minutes: 9    Ten Minutes:         Weight: 3 lb 5.3 oz (1510 g)   Live born female infant breech presentation.   Gravid uterus, normal tubes and ovaries.     Procedure:  The patient was taken to the operating room  where spinal anesthesia was found to be adequate.  She was placed in the dorsal supine position with a leftward tilt.  A Myers catheter was placed.  She was prepped and draped in the normal sterile fashion. A time-out was performed for the procedure and all personnel were in agreement.    A Pfannenstiel skin incision was made with a scalpel and carried through to the underlying layer of fascia.  The fascia was incised in the midline and the incision extended laterally using Benavidez scissors.  The inferior aspect of the fascial incision was then grasped with Kocher clamps x2, elevated, and the underlying rectus muscle dissected off bluntly and sharply.  In a similar fashion, the superior aspect of the fascia was grasped with Kocher clamps x2 and the underlying rectus muscle dissected off bluntly and sharply.  The rectus muscles were  in the midline.  The peritoneum was identified and entered bluntly. The peritoneal incision was extended with good visualization of the bladder.  The bladder blade was then inserted.    The lower uterine segment was scored using the a scalpel. A matt clamp was used to create the incision from the previously scored area. The uterine incision was then extended in a cephalocaudad fashion digitally. The left leg presented itself and was gently grasped. The bladder blade was removed and the right leg grasped and then the hips were grasped bilaterally and brought to the hysterotomy. The infant torso was then delivered. Next the right arm was swept across the fetal body. The infant was then rotated 180 degrees and the left are was swept across the infant's body. Next the fetal head delivered spontaneously. The nose and mouth were suctioned and the baby was stimulated for 30 seconds, after which the cord was doubly clamped and cut.  The infant was handed off to the waiting pediatricians.  Cord gases were collected.     Active management with Pitocin was started.  The placenta was then  removed and the uterus exteriorized and cleared of all clots and debris.  Uterine incision was repaired with 0 Vicryl in a running, locked fashion.  A second layer of the same suture was used to imbricate.  The incision was noted to be hemostatic.  The posterior gutter was cleared of all clots and debris.  The uterus was returned to the abdomen and the gutters cleared of all clots and debris.  The uterine incision was inspected again and found to be hemostatic.  The fascia was reapproximated with 0 Vicryl in a running fashion. The subcutaneous tissue was reapproximated with interrupted sutures of 2-0 plain gut.  The skin was closed in a subcuticular fashion using 4-0 monocryl. Steri strips and a pressure dressing was placed over the incision.    The patient tolerated the procedure well.  Sponge, lap, and needle counts were correct x 3.  Pt was taken to the recovery room in stable condition.     Attending: Dr. Pizarro   Resident: Sukhi Bryant MD PGY2   PAST MEDICAL HISTORY:  HSV infection

## 2025-02-24 VITALS — HEART RATE: 72 BPM | OXYGEN SATURATION: 97 %

## 2025-02-24 DIAGNOSIS — O36.8120 DECREASED FETAL MOVEMENTS, SECOND TRIMESTER, NOT APPLICABLE OR UNSPECIFIED: ICD-10-CM

## 2025-02-24 DIAGNOSIS — O09.292 SUPERVISION OF PREGNANCY WITH OTHER POOR REPRODUCTIVE OR OBSTETRIC HISTORY, SECOND TRIMESTER: ICD-10-CM

## 2025-02-24 DIAGNOSIS — R19.7 DIARRHEA, UNSPECIFIED: ICD-10-CM

## 2025-02-24 DIAGNOSIS — Z20.822 CONTACT WITH AND (SUSPECTED) EXPOSURE TO COVID-19: ICD-10-CM

## 2025-02-24 DIAGNOSIS — R50.9 FEVER, UNSPECIFIED: ICD-10-CM

## 2025-02-24 DIAGNOSIS — I95.9 HYPOTENSION, UNSPECIFIED: ICD-10-CM

## 2025-02-24 DIAGNOSIS — R10.2 PELVIC AND PERINEAL PAIN: ICD-10-CM

## 2025-02-24 DIAGNOSIS — Z3A.17 17 WEEKS GESTATION OF PREGNANCY: ICD-10-CM

## 2025-02-24 DIAGNOSIS — O99.412 DISEASES OF THE CIRCULATORY SYSTEM COMPLICATING PREGNANCY, SECOND TRIMESTER: ICD-10-CM

## 2025-02-24 DIAGNOSIS — O26.892 OTHER SPECIFIED PREGNANCY RELATED CONDITIONS, SECOND TRIMESTER: ICD-10-CM

## 2025-02-24 DIAGNOSIS — O09.522 SUPERVISION OF ELDERLY MULTIGRAVIDA, SECOND TRIMESTER: ICD-10-CM

## 2025-02-24 DIAGNOSIS — O21.9 VOMITING OF PREGNANCY, UNSPECIFIED: ICD-10-CM

## 2025-02-24 LAB
ALBUMIN SERPL ELPH-MCNC: 3.4 G/DL — SIGNIFICANT CHANGE UP (ref 3.3–5)
ALP SERPL-CCNC: 57 U/L — SIGNIFICANT CHANGE UP (ref 40–120)
ALT FLD-CCNC: 19 U/L — SIGNIFICANT CHANGE UP (ref 4–33)
ANION GAP SERPL CALC-SCNC: 14 MMOL/L — SIGNIFICANT CHANGE UP (ref 7–14)
ANISOCYTOSIS BLD QL: SIGNIFICANT CHANGE UP
APPEARANCE UR: ABNORMAL
AST SERPL-CCNC: 26 U/L — SIGNIFICANT CHANGE UP (ref 4–32)
BACTERIA # UR AUTO: ABNORMAL /HPF
BASOPHILS # BLD AUTO: 0 K/UL — SIGNIFICANT CHANGE UP (ref 0–0.2)
BASOPHILS NFR BLD AUTO: 0 % — SIGNIFICANT CHANGE UP (ref 0–2)
BILIRUB SERPL-MCNC: 1.1 MG/DL — SIGNIFICANT CHANGE UP (ref 0.2–1.2)
BILIRUB UR-MCNC: NEGATIVE — SIGNIFICANT CHANGE UP
BUN SERPL-MCNC: 7 MG/DL — SIGNIFICANT CHANGE UP (ref 7–23)
CALCIUM SERPL-MCNC: 8 MG/DL — LOW (ref 8.4–10.5)
CAST: 0 /LPF — SIGNIFICANT CHANGE UP (ref 0–4)
CHLORIDE SERPL-SCNC: 100 MMOL/L — SIGNIFICANT CHANGE UP (ref 98–107)
CO2 SERPL-SCNC: 19 MMOL/L — LOW (ref 22–31)
COLOR SPEC: SIGNIFICANT CHANGE UP
CREAT SERPL-MCNC: 0.49 MG/DL — LOW (ref 0.5–1.3)
DIFF PNL FLD: NEGATIVE — SIGNIFICANT CHANGE UP
EGFR: 122 ML/MIN/1.73M2 — SIGNIFICANT CHANGE UP
EOSINOPHIL # BLD AUTO: 0 K/UL — SIGNIFICANT CHANGE UP (ref 0–0.5)
EOSINOPHIL NFR BLD AUTO: 0 % — SIGNIFICANT CHANGE UP (ref 0–6)
FLUAV AG NPH QL: SIGNIFICANT CHANGE UP
FLUBV AG NPH QL: SIGNIFICANT CHANGE UP
GIANT PLATELETS BLD QL SMEAR: PRESENT — SIGNIFICANT CHANGE UP
GLUCOSE SERPL-MCNC: 104 MG/DL — HIGH (ref 70–99)
GLUCOSE UR QL: NEGATIVE MG/DL — SIGNIFICANT CHANGE UP
HCT VFR BLD CALC: 34 % — LOW (ref 34.5–45)
HGB BLD-MCNC: 11.7 G/DL — SIGNIFICANT CHANGE UP (ref 11.5–15.5)
IANC: 6.76 K/UL — SIGNIFICANT CHANGE UP (ref 1.8–7.4)
KETONES UR-MCNC: ABNORMAL MG/DL
LEUKOCYTE ESTERASE UR-ACNC: NEGATIVE — SIGNIFICANT CHANGE UP
LYMPHOCYTES # BLD AUTO: 0.34 K/UL — LOW (ref 1–3.3)
LYMPHOCYTES # BLD AUTO: 4.4 % — LOW (ref 13–44)
MACROCYTES BLD QL: SIGNIFICANT CHANGE UP
MAGNESIUM SERPL-MCNC: 1.6 MG/DL — SIGNIFICANT CHANGE UP (ref 1.6–2.6)
MANUAL SMEAR VERIFICATION: SIGNIFICANT CHANGE UP
MCHC RBC-ENTMCNC: 29.8 PG — SIGNIFICANT CHANGE UP (ref 27–34)
MCHC RBC-ENTMCNC: 34.4 G/DL — SIGNIFICANT CHANGE UP (ref 32–36)
MCV RBC AUTO: 86.7 FL — SIGNIFICANT CHANGE UP (ref 80–100)
MONOCYTES # BLD AUTO: 0.07 K/UL — SIGNIFICANT CHANGE UP (ref 0–0.9)
MONOCYTES NFR BLD AUTO: 0.9 % — LOW (ref 2–14)
NEUTROPHILS # BLD AUTO: 7.12 K/UL — SIGNIFICANT CHANGE UP (ref 1.8–7.4)
NEUTROPHILS NFR BLD AUTO: 67.8 % — SIGNIFICANT CHANGE UP (ref 43–77)
NEUTS BAND # BLD: 25.2 % — CRITICAL HIGH (ref 0–6)
NEUTS BAND NFR BLD: 25.2 % — CRITICAL HIGH (ref 0–6)
NITRITE UR-MCNC: NEGATIVE — SIGNIFICANT CHANGE UP
PH UR: 5.5 — SIGNIFICANT CHANGE UP (ref 5–8)
PHOSPHATE SERPL-MCNC: 3.3 MG/DL — SIGNIFICANT CHANGE UP (ref 2.5–4.5)
PLAT MORPH BLD: NORMAL — SIGNIFICANT CHANGE UP
PLATELET # BLD AUTO: 198 K/UL — SIGNIFICANT CHANGE UP (ref 150–400)
PLATELET COUNT - ESTIMATE: NORMAL — SIGNIFICANT CHANGE UP
POLYCHROMASIA BLD QL SMEAR: SLIGHT — SIGNIFICANT CHANGE UP
POTASSIUM SERPL-MCNC: 3.3 MMOL/L — LOW (ref 3.5–5.3)
POTASSIUM SERPL-SCNC: 3.3 MMOL/L — LOW (ref 3.5–5.3)
PROT SERPL-MCNC: 6.4 G/DL — SIGNIFICANT CHANGE UP (ref 6–8.3)
PROT UR-MCNC: 30 MG/DL
RBC # BLD: 3.92 M/UL — SIGNIFICANT CHANGE UP (ref 3.8–5.2)
RBC # FLD: 12.5 % — SIGNIFICANT CHANGE UP (ref 10.3–14.5)
RBC BLD AUTO: ABNORMAL
RBC CASTS # UR COMP ASSIST: 6 /HPF — HIGH (ref 0–4)
RSV RNA NPH QL NAA+NON-PROBE: SIGNIFICANT CHANGE UP
SARS-COV-2 RNA SPEC QL NAA+PROBE: SIGNIFICANT CHANGE UP
SODIUM SERPL-SCNC: 133 MMOL/L — LOW (ref 135–145)
SP GR SPEC: 1.03 — SIGNIFICANT CHANGE UP (ref 1–1.03)
SQUAMOUS # UR AUTO: 15 /HPF — HIGH (ref 0–5)
UROBILINOGEN FLD QL: 1 MG/DL — SIGNIFICANT CHANGE UP (ref 0.2–1)
VARIANT LYMPHS # BLD: 1.7 % — SIGNIFICANT CHANGE UP (ref 0–6)
VARIANT LYMPHS NFR BLD MANUAL: 1.7 % — SIGNIFICANT CHANGE UP (ref 0–6)
WBC # BLD: 7.66 K/UL — SIGNIFICANT CHANGE UP (ref 3.8–10.5)
WBC # FLD AUTO: 7.66 K/UL — SIGNIFICANT CHANGE UP (ref 3.8–10.5)
WBC UR QL: 1 /HPF — SIGNIFICANT CHANGE UP (ref 0–5)

## 2025-02-24 RX ORDER — SODIUM CHLORIDE 9 G/1000ML
1000 INJECTION, SOLUTION INTRAVENOUS ONCE
Refills: 0 | Status: DISCONTINUED | OUTPATIENT
Start: 2025-02-24 | End: 2025-03-10

## 2025-02-24 RX ORDER — SODIUM CHLORIDE 9 G/1000ML
1000 INJECTION, SOLUTION INTRAVENOUS ONCE
Refills: 0 | Status: COMPLETED | OUTPATIENT
Start: 2025-02-24 | End: 2025-02-24

## 2025-02-24 RX ORDER — ACETAMINOPHEN 500 MG/5ML
1000 LIQUID (ML) ORAL ONCE
Refills: 0 | Status: COMPLETED | OUTPATIENT
Start: 2025-02-24 | End: 2025-02-24

## 2025-02-24 RX ORDER — ONDANSETRON HCL/PF 4 MG/2 ML
4 VIAL (ML) INJECTION ONCE
Refills: 0 | Status: COMPLETED | OUTPATIENT
Start: 2025-02-24 | End: 2025-02-24

## 2025-02-24 RX ADMIN — SODIUM CHLORIDE 1000 MILLILITER(S): 9 INJECTION, SOLUTION INTRAVENOUS at 00:33

## 2025-02-24 RX ADMIN — Medication 1000 MILLIGRAM(S): at 01:10

## 2025-02-24 RX ADMIN — Medication 4 MILLIGRAM(S): at 00:30

## 2025-02-24 RX ADMIN — Medication 400 MILLIGRAM(S): at 00:35

## 2025-02-24 RX ADMIN — SODIUM CHLORIDE 1000 MILLILITER(S): 9 INJECTION, SOLUTION INTRAVENOUS at 03:01
